# Patient Record
Sex: MALE | Race: WHITE | URBAN - NONMETROPOLITAN AREA
[De-identification: names, ages, dates, MRNs, and addresses within clinical notes are randomized per-mention and may not be internally consistent; named-entity substitution may affect disease eponyms.]

---

## 2017-02-16 ENCOUNTER — OFFICE VISIT - GICH (OUTPATIENT)
Dept: FAMILY MEDICINE | Facility: OTHER | Age: 47
End: 2017-02-16

## 2017-02-16 ENCOUNTER — HISTORY (OUTPATIENT)
Dept: FAMILY MEDICINE | Facility: OTHER | Age: 47
End: 2017-02-16

## 2017-02-16 DIAGNOSIS — Z72.0 TOBACCO USE: ICD-10-CM
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
## 2017-02-16 DIAGNOSIS — J18.9 PNEUMONIA: ICD-10-CM

## 2017-02-16 DIAGNOSIS — I10 ESSENTIAL (PRIMARY) HYPERTENSION: ICD-10-CM

## 2017-02-16 DIAGNOSIS — S06.9X6D: ICD-10-CM

## 2017-02-16 DIAGNOSIS — F32.1 MAJOR DEPRESSIVE DISORDER, SINGLE EPISODE, MODERATE (H): ICD-10-CM

## 2017-02-16 ASSESSMENT — PATIENT HEALTH QUESTIONNAIRE - PHQ9: SUM OF ALL RESPONSES TO PHQ QUESTIONS 1-9: 14

## 2017-05-03 ENCOUNTER — OFFICE VISIT - GICH (OUTPATIENT)
Dept: FAMILY MEDICINE | Facility: OTHER | Age: 47
End: 2017-05-03

## 2017-05-03 ENCOUNTER — HISTORY (OUTPATIENT)
Dept: FAMILY MEDICINE | Facility: OTHER | Age: 47
End: 2017-05-03

## 2017-05-03 DIAGNOSIS — J40 BRONCHITIS: ICD-10-CM

## 2017-05-03 DIAGNOSIS — H61.22 IMPACTED CERUMEN OF LEFT EAR: ICD-10-CM

## 2017-05-03 DIAGNOSIS — Z72.0 TOBACCO USE: ICD-10-CM

## 2017-05-03 ASSESSMENT — PATIENT HEALTH QUESTIONNAIRE - PHQ9: SUM OF ALL RESPONSES TO PHQ QUESTIONS 1-9: 0

## 2017-08-08 ENCOUNTER — COMMUNICATION - GICH (OUTPATIENT)
Dept: FAMILY MEDICINE | Facility: OTHER | Age: 47
End: 2017-08-08

## 2017-08-08 DIAGNOSIS — F32.1 MAJOR DEPRESSIVE DISORDER, SINGLE EPISODE, MODERATE (H): ICD-10-CM

## 2017-12-05 ENCOUNTER — COMMUNICATION - GICH (OUTPATIENT)
Dept: FAMILY MEDICINE | Facility: OTHER | Age: 47
End: 2017-12-05

## 2017-12-05 DIAGNOSIS — I10 ESSENTIAL (PRIMARY) HYPERTENSION: ICD-10-CM

## 2017-12-28 NOTE — TELEPHONE ENCOUNTER
Patient Information     Patient Name MRN Sex Jax Fofana [id number] Male 1970      Telephone Encounter by Luiz Mirza RN at 8/10/2017  7:57 AM     Author:  Luiz Mirza RN Service:  (none) Author Type:  NURS- Registered Nurse     Filed:  8/10/2017  8:00 AM Encounter Date:  2017 Status:  Signed     :  Luiz Mirza RN (NURS- Registered Nurse)            Medication was restarted 17.  Depression-in adults 18 and over  SSRI    Office visit in the past 12 months or as indicated in chart.  Should have clinic visit 1-2 months after initial prescription.    Last visit with PHIL VALENCIA was on: 2017 in [facility]  Next visit with PHIL VALENCAI is on: No future appointment listed with this provider  Next visit with Family Practice is on: No future appointment listed in this department    Max refills 12 months from last office visit or per providers notes.  Prescription refilled per RN Medication Refill Policy.................... LUIZ MIRZA RN ....................  8/10/2017   7:59 AM

## 2018-01-03 NOTE — NURSING NOTE
Patient Information     Patient Name MRN Sex Jax Fofana [id number] Male 1970      Nursing Note by Niurka Delgadillo at 2017  1:30 PM     Author:  Niruka Delgadillo Service:  (none) Author Type:  (none)     Filed:  2017  1:51 PM Encounter Date:  2017 Status:  Signed     :  Niurka Delgadillo            Patient here to establish care. Patient was in [place] with  and with  for left knee scope. Niurka Delgadillo LPN .......................2017  1:29 PM
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
## 2018-01-04 NOTE — PROGRESS NOTES
Patient Information     Patient Name MRN Sex     Jax Villanueva [id number] Male 1970      Progress Notes by Mansoor Hair MD at 5/3/2017  1:00 PM     Author:  Mansoor Hair MD Service:  (none) Author Type:  Physician     Filed:  2017  9:41 AM Encounter Date:  5/3/2017 Status:  Signed     :  Mansoor Hair MD (Physician)            SUBJECTIVE:    Jax Villanueva is a 47 y.o. male who presents for cough left ear plug    HPI Comments: Patient also reports cough and congestion plugged left ear. This been going on for 2-3 weeks. Seems to get better with walking less. No fevers or chills.    Nicotine Dependence   Presents for initial visit. Symptoms include cravings. Preferred tobacco types include cigarettes. Preferred cigarette types include filtered. Preferred strength is regular. Preferred cigarettes are non-menthol. Preferred brands include Benicia. His urge triggers include company of smokers, drinking coffee, driving, meal time and stress. Risk factors do not include drinking alcohol.His first smoke is from 8 to 10 AM. He started smoking when he was 15-17 years old. Past treatments include nothing. Compliance with prior treatments has been variable. Jax is ready to quit. Jax has tried to quit 1 time. There is no history of alcohol abuse and drug use.       No Known Allergies,   Family History       Problem   Relation Age of Onset     Cancer-colon  Father      Diabetes  No Family History      Other  Mother      pulmonary embolism     ,   Current Outpatient Prescriptions on File Prior to Visit       Medication  Sig Dispense Refill     lisinopril-hydrochlorothiazide (10-12.5 mg) tablet (PRINZIDE; ZESTORETIC) Take 1 tablet by mouth once daily. 90 tablet 2     sertraline (ZOLOFT) 100 mg tablet Take 1 tablet by mouth once daily. 90 tablet 1     No current facility-administered medications on file prior to visit.    ,   Past Surgical History:      Procedure  Laterality Date      KNEE SURGERY Left     sees Dr Braswell      ,   Social History       Substance Use Topics         Smoking status:   Current Every Day Smoker     Packs/day:  1.50     Years:  20.00     Types:  Cigarettes     Smokeless tobacco:   Never Used     Alcohol use   No      Comment: rarely     and   Social History       Substance Use Topics         Smoking status:   Current Every Day Smoker     Packs/day:  1.50     Years:  20.00     Types:  Cigarettes     Smokeless tobacco:   Never Used     Alcohol use   No      Comment: rarely        REVIEW OF SYSTEMS:  ROS    OBJECTIVE:  /78  Pulse (!) 102  Temp 97.9  F (36.6  C) (Temporal)  Wt (!) 145.9 kg (321 lb 9.6 oz)  SpO2 92%  BMI 43.62 kg/m2    EXAM:   Physical Exam   Constitutional: He is well-developed, well-nourished, and in no distress.   HENT:   Right Ear: External ear normal.   Left Ear: External ear normal.   Mouth/Throat: No oropharyngeal exudate.   Left TM is occluded by wax and debris   Eyes: Pupils are equal, round, and reactive to light.   Cardiovascular: Normal rate, regular rhythm and normal heart sounds.    Pulmonary/Chest: Effort normal. No respiratory distress. He has wheezes.   Abdominal: Soft.   Neurological: He is alert.   Psychiatric: Affect normal.       ASSESSMENT/PLAN:    ICD-10-CM    1. Tobacco abuse Z72.0 azithromycin (ZITHROMAX) 250 mg tablet      predniSONE (DELTASONE) 20 mg tablet      varenicline (CHANTIX STARTING MONTH BOX) 0.5 mg (11)- 1 mg (42) tablet      varenicline (CHANTIX CONTINUING MONTH BOX) 1 mg tablet   2. Impacted cerumen of left ear H61.22 EAR WAX REMOVAL   3. Bronchitis J40 azithromycin (ZITHROMAX) 250 mg tablet      predniSONE (DELTASONE) 20 mg tablet        Plan:  12 minutes spent with patient counseling tobacco abuse. Encouraged him to quit.  Ear was lavaged with good results.  We'll start the patient on prednisone and Zithromax for his chronic cough.

## 2018-01-04 NOTE — NURSING NOTE
Patient Information     Patient Name MRN Sex Jax Fofana [id number] Male 1970      Nursing Note by Niurka Delgadillo at 5/3/2017  1:00 PM     Author:  Niurka Delgadillo Service:  (none) Author Type:  (none)     Filed:  5/3/2017  1:13 PM Encounter Date:  5/3/2017 Status:  Signed     :  Niurka Delgadillo            Patient here for cough starting 3 weeks ago and his left ear feels plugged. Niurka Delgadillo LPN .......................5/3/2017  1:10 PM

## 2018-01-27 VITALS
SYSTOLIC BLOOD PRESSURE: 124 MMHG | BODY MASS INDEX: 43.62 KG/M2 | TEMPERATURE: 97.9 F | OXYGEN SATURATION: 92 % | DIASTOLIC BLOOD PRESSURE: 78 MMHG | WEIGHT: 315 LBS | HEART RATE: 102 BPM

## 2018-01-27 VITALS
HEART RATE: 100 BPM | HEIGHT: 72 IN | DIASTOLIC BLOOD PRESSURE: 100 MMHG | BODY MASS INDEX: 42.56 KG/M2 | WEIGHT: 314.2 LBS | SYSTOLIC BLOOD PRESSURE: 148 MMHG

## 2018-01-29 ENCOUNTER — DOCUMENTATION ONLY (OUTPATIENT)
Dept: FAMILY MEDICINE | Facility: OTHER | Age: 48
End: 2018-01-29

## 2018-01-29 PROBLEM — H61.22 IMPACTED CERUMEN OF LEFT EAR: Status: ACTIVE | Noted: 2017-05-03

## 2018-01-29 PROBLEM — S06.9X9A TRAUMATIC BRAIN INJURY WITH LOSS OF CONSCIOUSNESS (H): Status: ACTIVE | Noted: 2017-02-16

## 2018-01-29 PROBLEM — J40 BRONCHITIS: Status: ACTIVE | Noted: 2017-05-03
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
## 2018-01-29 PROBLEM — Z72.0 TOBACCO ABUSE: Status: ACTIVE | Noted: 2017-02-16

## 2018-01-29 PROBLEM — F32.1 MODERATE SINGLE CURRENT EPISODE OF MAJOR DEPRESSIVE DISORDER (H): Status: ACTIVE | Noted: 2017-02-16

## 2018-01-29 RX ORDER — SERTRALINE HYDROCHLORIDE 100 MG/1
100 TABLET, FILM COATED ORAL DAILY
COMMUNITY
Start: 2017-08-10

## 2018-01-29 RX ORDER — LISINOPRIL/HYDROCHLOROTHIAZIDE 10-12.5 MG
1 TABLET ORAL DAILY
COMMUNITY
Start: 2017-12-08

## 2018-01-31 ASSESSMENT — PATIENT HEALTH QUESTIONNAIRE - PHQ9
SUM OF ALL RESPONSES TO PHQ QUESTIONS 1-9: 14
SUM OF ALL RESPONSES TO PHQ QUESTIONS 1-9: 0

## 2018-02-12 NOTE — TELEPHONE ENCOUNTER
Patient Information     Patient Name MRN Sex Jax Fofana [id number] Male 1970      Telephone Encounter by Nataly Haque RN at 2017  9:54 AM     Author:  Nataly Haque RN Service:  (none) Author Type:  NURS- Registered Nurse     Filed:  2017  9:54 AM Encounter Date:  2017 Status:  Signed     :  Nataly Haque RN (NURS- Registered Nurse)            Left message to call back  ....................Nataly Haque RN  2017   9:54 AM

## 2018-02-12 NOTE — TELEPHONE ENCOUNTER
Patient Information     Patient Name MRN Sex Jax Fofana [id number] Male 1970      Telephone Encounter by Paulette Huang RN at 2017  9:53 AM     Author:  Paulette Huang RN Service:  (none) Author Type:  NURS- Registered Nurse     Filed:  2017  9:56 AM Encounter Date:  2017 Status:  Signed     :  Paulette Huang RN (NURS- Registered Nurse)            Attempted to reach patient by phone. His EC, Jazzmine answered and reported that he was not currently available. She then actually requested an appointment for this patient. She states that the patient is concerned about COPD related to heavy smoking. She was transferred to scheduling.    Diuretic Combinations    Office visit in the past 12 months or per provider note.    Last visit with PHIL VALENCIA was on: 2017 in [facility]  Next visit with PHIL VALENCIA is on: 2017 in [facility]  Next visit with Family Practice is on: 2017 in [facility]    Lab test requirements:  Creatinine and Potassium annually, if ordering lab, order BMP.  CREATININE (mg/dL)    Date Value   07/15/2017 0.90     POTASSIUM (mmol/L)    Date Value   07/15/2017 3.6       Max refill for 12 months from last office visit or per provider note.    Patient is due for medication management appointment. Limited refill provided at this time. Noted upcoming appointment. Prescription refilled per RN Medication Refill Policy.................... Paulette Huang RN ....................  2017   9:55 AM

## 2018-02-12 NOTE — TELEPHONE ENCOUNTER
"Patient Information     Patient Name MRN Sex Jax Fofana [id number] Male 1970      Telephone Encounter by Nataly Haque RN at 2017  8:43 AM     Author:  Nataly Haque RN Service:  (none) Author Type:  NURS- Registered Nurse     Filed:  2017  8:53 AM Encounter Date:  2017 Status:  Signed     :  aNtaly Haque RN (NURS- Registered Nurse)            Patient established care with Mansoor Hair MD  on 17 and has been in only once since for cough and ear problem.     Per note on 17, \"blood pressure is not under good control. Recommend following up in 2-3 weeks for blood testing. In between that time blood pressure checks here at [facility] outpatient.\" Patient has not been to have any outpatient blood pressure checks.     Attempted reaching Patient to ask about blood pressure and assist him in scheduling a medication management or blood pressure check appointment. Left message to call back  ....................Nataly Haque RN  2017   8:52 AM                  "

## 2025-07-23 ENCOUNTER — HOSPITAL ENCOUNTER (EMERGENCY)
Facility: OTHER | Age: 55
Discharge: HOME | End: 2025-07-23
Attending: FAMILY MEDICINE

## 2025-07-23 ENCOUNTER — APPOINTMENT (OUTPATIENT)
Dept: CT IMAGING | Facility: OTHER | Age: 55
End: 2025-07-23
Attending: STUDENT IN AN ORGANIZED HEALTH CARE EDUCATION/TRAINING PROGRAM

## 2025-07-23 VITALS
BODY MASS INDEX: 42.66 KG/M2 | HEART RATE: 118 BPM | OXYGEN SATURATION: 92 % | WEIGHT: 315 LBS | RESPIRATION RATE: 28 BRPM | HEIGHT: 72 IN | DIASTOLIC BLOOD PRESSURE: 63 MMHG | TEMPERATURE: 97.6 F | SYSTOLIC BLOOD PRESSURE: 124 MMHG

## 2025-07-23 DIAGNOSIS — N20.0 KIDNEY STONE ON LEFT SIDE: Primary | ICD-10-CM

## 2025-07-23 DIAGNOSIS — N39.0 ACUTE LOWER UTI: ICD-10-CM

## 2025-07-23 LAB
ALBUMIN SERPL BCG-MCNC: 4.1 G/DL (ref 3.5–5.2)
ALBUMIN UR-MCNC: 100 MG/DL
ALP SERPL-CCNC: 90 U/L (ref 40–150)
ALT SERPL W P-5'-P-CCNC: 46 U/L (ref 0–70)
ANION GAP SERPL CALCULATED.3IONS-SCNC: 15 MMOL/L (ref 7–15)
APPEARANCE UR: ABNORMAL
AST SERPL W P-5'-P-CCNC: 40 U/L (ref 0–45)
BACTERIA #/AREA URNS HPF: ABNORMAL /HPF
BASOPHILS # BLD AUTO: 0.1 10E3/UL (ref 0–0.2)
BASOPHILS NFR BLD AUTO: 0 %
BILIRUB SERPL-MCNC: 0.5 MG/DL
BILIRUB UR QL STRIP: NEGATIVE
BUN SERPL-MCNC: 17.5 MG/DL (ref 6–20)
CALCIUM SERPL-MCNC: 9.1 MG/DL (ref 8.8–10.4)
CHLORIDE SERPL-SCNC: 102 MMOL/L (ref 98–107)
COLOR UR AUTO: YELLOW
CREAT SERPL-MCNC: 1.06 MG/DL (ref 0.67–1.17)
CRP SERPL-MCNC: 19.66 MG/L
D DIMER PPP FEU-MCNC: 0.8 UG/ML FEU (ref 0–0.5)
EGFRCR SERPLBLD CKD-EPI 2021: 82 ML/MIN/1.73M2
EOSINOPHIL # BLD AUTO: 0.3 10E3/UL (ref 0–0.7)
EOSINOPHIL NFR BLD AUTO: 2 %
ERYTHROCYTE [DISTWIDTH] IN BLOOD BY AUTOMATED COUNT: 18.7 % (ref 10–15)
GLUCOSE SERPL-MCNC: 143 MG/DL (ref 70–99)
GLUCOSE UR STRIP-MCNC: NEGATIVE MG/DL
HCO3 SERPL-SCNC: 24 MMOL/L (ref 22–29)
HCT VFR BLD AUTO: 50.7 % (ref 40–53)
HGB BLD-MCNC: 15.8 G/DL (ref 13.3–17.7)
HGB UR QL STRIP: ABNORMAL
HOLD SPECIMEN: NORMAL
IMM GRANULOCYTES # BLD: 0.1 10E3/UL
IMM GRANULOCYTES NFR BLD: 1 %
INR PPP: 1.01 (ref 0.85–1.15)
KETONES UR STRIP-MCNC: NEGATIVE MG/DL
LACTATE SERPL-SCNC: 1.9 MMOL/L (ref 0.7–2)
LACTATE SERPL-SCNC: 2.1 MMOL/L (ref 0.7–2)
LEUKOCYTE ESTERASE UR QL STRIP: ABNORMAL
LYMPHOCYTES # BLD AUTO: 2.4 10E3/UL (ref 0.8–5.3)
LYMPHOCYTES NFR BLD AUTO: 15 %
MCH RBC QN AUTO: 26.8 PG (ref 26.5–33)
MCHC RBC AUTO-ENTMCNC: 31.2 G/DL (ref 31.5–36.5)
MCV RBC AUTO: 86 FL (ref 78–100)
MONOCYTES # BLD AUTO: 1.2 10E3/UL (ref 0–1.3)
MONOCYTES NFR BLD AUTO: 7 %
MUCOUS THREADS #/AREA URNS LPF: PRESENT /LPF
NEUTROPHILS # BLD AUTO: 12.4 10E3/UL (ref 1.6–8.3)
NEUTROPHILS NFR BLD AUTO: 76 %
NITRATE UR QL: NEGATIVE
NRBC # BLD AUTO: 0 10E3/UL
NRBC BLD AUTO-RTO: 0 /100
NT-PROBNP SERPL-MCNC: 41 PG/ML (ref 0–177)
PH UR STRIP: 7 [PH] (ref 5–9)
PLATELET # BLD AUTO: 291 10E3/UL (ref 150–450)
POTASSIUM SERPL-SCNC: 5 MMOL/L (ref 3.4–5.3)
PROCALCITONIN SERPL IA-MCNC: 0.09 NG/ML
PROT SERPL-MCNC: 7.8 G/DL (ref 6.4–8.3)
PROTHROMBIN TIME: 13.4 SECONDS (ref 11.8–14.8)
RADIOLOGIST FLAGS: NORMAL
RBC # BLD AUTO: 5.9 10E6/UL (ref 4.4–5.9)
RBC URINE: 108 /HPF
SODIUM SERPL-SCNC: 141 MMOL/L (ref 135–145)
SP GR UR STRIP: 1.02 (ref 1–1.03)
SQUAMOUS EPITHELIAL: 3 /HPF
TROPONIN T SERPL HS-MCNC: 11 NG/L
TROPONIN T SERPL HS-MCNC: 11 NG/L
UROBILINOGEN UR STRIP-MCNC: 2 MG/DL
WBC # BLD AUTO: 16.4 10E3/UL (ref 4–11)
WBC CLUMPS #/AREA URNS HPF: PRESENT /HPF
WBC URINE: >182 /HPF

## 2025-07-23 PROCEDURE — 74176 CT ABD & PELVIS W/O CONTRAST: CPT

## 2025-07-23 PROCEDURE — 250N000011 HC RX IP 250 OP 636: Performed by: FAMILY MEDICINE

## 2025-07-23 PROCEDURE — 96365 THER/PROPH/DIAG IV INF INIT: CPT | Mod: XU | Performed by: FAMILY MEDICINE

## 2025-07-23 PROCEDURE — 36415 COLL VENOUS BLD VENIPUNCTURE: CPT | Performed by: FAMILY MEDICINE

## 2025-07-23 PROCEDURE — 87040 BLOOD CULTURE FOR BACTERIA: CPT | Performed by: FAMILY MEDICINE

## 2025-07-23 PROCEDURE — 87086 URINE CULTURE/COLONY COUNT: CPT | Performed by: FAMILY MEDICINE

## 2025-07-23 PROCEDURE — 83880 ASSAY OF NATRIURETIC PEPTIDE: CPT | Performed by: FAMILY MEDICINE

## 2025-07-23 PROCEDURE — 999N000157 HC STATISTIC RCP TIME EA 10 MIN

## 2025-07-23 PROCEDURE — 83605 ASSAY OF LACTIC ACID: CPT | Performed by: FAMILY MEDICINE

## 2025-07-23 PROCEDURE — 93005 ELECTROCARDIOGRAM TRACING: CPT | Performed by: FAMILY MEDICINE

## 2025-07-23 PROCEDURE — 96360 HYDRATION IV INFUSION INIT: CPT | Mod: XS | Performed by: FAMILY MEDICINE

## 2025-07-23 PROCEDURE — 258N000003 HC RX IP 258 OP 636: Performed by: FAMILY MEDICINE

## 2025-07-23 PROCEDURE — 99284 EMERGENCY DEPT VISIT MOD MDM: CPT | Performed by: FAMILY MEDICINE

## 2025-07-23 PROCEDURE — 74176 CT ABD & PELVIS W/O CONTRAST: CPT | Mod: 26 | Performed by: RADIOLOGY

## 2025-07-23 PROCEDURE — 96375 TX/PRO/DX INJ NEW DRUG ADDON: CPT | Mod: XU | Performed by: FAMILY MEDICINE

## 2025-07-23 PROCEDURE — 250N000009 HC RX 250: Performed by: FAMILY MEDICINE

## 2025-07-23 PROCEDURE — 99285 EMERGENCY DEPT VISIT HI MDM: CPT | Mod: 25 | Performed by: FAMILY MEDICINE

## 2025-07-23 PROCEDURE — 85610 PROTHROMBIN TIME: CPT | Performed by: FAMILY MEDICINE

## 2025-07-23 PROCEDURE — 94640 AIRWAY INHALATION TREATMENT: CPT

## 2025-07-23 PROCEDURE — 86140 C-REACTIVE PROTEIN: CPT | Performed by: STUDENT IN AN ORGANIZED HEALTH CARE EDUCATION/TRAINING PROGRAM

## 2025-07-23 PROCEDURE — 85004 AUTOMATED DIFF WBC COUNT: CPT | Performed by: FAMILY MEDICINE

## 2025-07-23 PROCEDURE — 81001 URINALYSIS AUTO W/SCOPE: CPT | Performed by: FAMILY MEDICINE

## 2025-07-23 PROCEDURE — 84145 PROCALCITONIN (PCT): CPT | Performed by: STUDENT IN AN ORGANIZED HEALTH CARE EDUCATION/TRAINING PROGRAM

## 2025-07-23 PROCEDURE — 84484 ASSAY OF TROPONIN QUANT: CPT | Performed by: FAMILY MEDICINE

## 2025-07-23 PROCEDURE — 93010 ELECTROCARDIOGRAM REPORT: CPT | Performed by: INTERNAL MEDICINE

## 2025-07-23 PROCEDURE — 85379 FIBRIN DEGRADATION QUANT: CPT | Performed by: FAMILY MEDICINE

## 2025-07-23 PROCEDURE — 80053 COMPREHEN METABOLIC PANEL: CPT | Performed by: FAMILY MEDICINE

## 2025-07-23 PROCEDURE — 96361 HYDRATE IV INFUSION ADD-ON: CPT | Mod: XS | Performed by: FAMILY MEDICINE

## 2025-07-23 RX ORDER — CIPROFLOXACIN 500 MG/1
500 TABLET, FILM COATED ORAL 2 TIMES DAILY
Qty: 10 TABLET | Refills: 0 | Status: SHIPPED | OUTPATIENT
Start: 2025-07-23 | End: 2025-07-28

## 2025-07-23 RX ORDER — CEFTRIAXONE 2 G/1
2 INJECTION, POWDER, FOR SOLUTION INTRAMUSCULAR; INTRAVENOUS ONCE
Status: COMPLETED | OUTPATIENT
Start: 2025-07-23 | End: 2025-07-23

## 2025-07-23 RX ORDER — IOPAMIDOL 755 MG/ML
110 INJECTION, SOLUTION INTRAVASCULAR ONCE
Status: COMPLETED | OUTPATIENT
Start: 2025-07-23 | End: 2025-07-23

## 2025-07-23 RX ORDER — METHYLPREDNISOLONE SODIUM SUCCINATE 125 MG/2ML
60 INJECTION INTRAMUSCULAR; INTRAVENOUS ONCE
Status: COMPLETED | OUTPATIENT
Start: 2025-07-23 | End: 2025-07-23

## 2025-07-23 RX ORDER — KETOROLAC TROMETHAMINE 30 MG/ML
30 INJECTION, SOLUTION INTRAMUSCULAR; INTRAVENOUS ONCE
Status: COMPLETED | OUTPATIENT
Start: 2025-07-23 | End: 2025-07-23

## 2025-07-23 RX ORDER — IPRATROPIUM BROMIDE AND ALBUTEROL SULFATE 2.5; .5 MG/3ML; MG/3ML
3 SOLUTION RESPIRATORY (INHALATION) ONCE
Status: COMPLETED | OUTPATIENT
Start: 2025-07-23 | End: 2025-07-23

## 2025-07-23 RX ADMIN — IOPAMIDOL 110 ML: 755 INJECTION, SOLUTION INTRAVENOUS at 18:09

## 2025-07-23 RX ADMIN — CEFTRIAXONE 2 G: 2 INJECTION, POWDER, FOR SOLUTION INTRAMUSCULAR; INTRAVENOUS at 17:50

## 2025-07-23 RX ADMIN — KETOROLAC TROMETHAMINE 30 MG: 30 INJECTION, SOLUTION INTRAMUSCULAR at 16:47

## 2025-07-23 RX ADMIN — SODIUM CHLORIDE 80 ML: 9 INJECTION, SOLUTION INTRAVENOUS at 18:18

## 2025-07-23 RX ADMIN — METHYLPREDNISOLONE SODIUM SUCCINATE 62.5 MG: 125 INJECTION, POWDER, FOR SOLUTION INTRAMUSCULAR; INTRAVENOUS at 16:47

## 2025-07-23 RX ADMIN — SODIUM CHLORIDE 1000 ML: 0.9 INJECTION, SOLUTION INTRAVENOUS at 19:21

## 2025-07-23 RX ADMIN — IPRATROPIUM BROMIDE AND ALBUTEROL SULFATE 3 ML: .5; 3 SOLUTION RESPIRATORY (INHALATION) at 17:05

## 2025-07-23 ASSESSMENT — ACTIVITIES OF DAILY LIVING (ADL)
ADLS_ACUITY_SCORE: 41

## 2025-07-23 ASSESSMENT — ENCOUNTER SYMPTOMS
VOMITING: 0
FEVER: 0
SHORTNESS OF BREATH: 0
FATIGUE: 1
FLANK PAIN: 0
DIARRHEA: 0
ABDOMINAL PAIN: 0
NAUSEA: 0
COUGH: 0

## 2025-07-23 ASSESSMENT — COLUMBIA-SUICIDE SEVERITY RATING SCALE - C-SSRS
2. HAVE YOU ACTUALLY HAD ANY THOUGHTS OF KILLING YOURSELF IN THE PAST MONTH?: NO
1. IN THE PAST MONTH, HAVE YOU WISHED YOU WERE DEAD OR WISHED YOU COULD GO TO SLEEP AND NOT WAKE UP?: NO
6. HAVE YOU EVER DONE ANYTHING, STARTED TO DO ANYTHING, OR PREPARED TO DO ANYTHING TO END YOUR LIFE?: NO

## 2025-07-23 NOTE — ED TRIAGE NOTES
ED Nursing Triage Note (General)   ________________________________    Jax Villanueva is a 55 year old Male that presents to triage via private vehicle with complaints of R) foot pain/numbess x1 week.  Patient states he has sustained 2 falls within the last week and since then now also has R) sided hip pain.  Pain is rated 9/10, however, no OTC medication taken prior to arrival. Patient also reports increased SOB.  Patient has a hx of COPD and states was previously on 02 after being dx with pneumonia, however, does not wear home 02.  Patient states 02 is slowly getting worse over the past year, however, states he has not been seen during the past year due to lack of insurance.   Significant symptoms had onset 1 week(s) ago.  Vital signs:  Temp: 97.6  F (36.4  C) Temp src: Tympanic BP: 126/70 Pulse: 116   Resp: 28 SpO2: 92 %     Height: 182.9 cm (6') Weight: (!) 145.6 kg (321 lb)  Estimated body mass index is 43.54 kg/m  as calculated from the following:    Height as of this encounter: 1.829 m (6').    Weight as of this encounter: 145.6 kg (321 lb).  PRE HOSPITAL PRIOR LIVING SITUATION Alone      Triage Assessment (Adult)       Row Name 07/23/25 1559          Triage Assessment    Airway WDL WDL        Respiratory WDL    Respiratory WDL X        Skin Circulation/Temperature WDL    Skin Circulation/Temperature WDL WDL        Cardiac WDL    Cardiac WDL WDL     Cardiac Rhythm ST        Peripheral/Neurovascular WDL    Peripheral Neurovascular WDL WDL        Cognitive/Neuro/Behavioral WDL    Cognitive/Neuro/Behavioral WDL WDL
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
## 2025-07-24 LAB
BACTERIA SPEC CULT: NORMAL
BACTERIA SPEC CULT: NORMAL
BACTERIA UR CULT: NORMAL

## 2025-07-24 NOTE — ED PROVIDER NOTES
[facility]    eMERGENCY dEPARTMENT eNCOUnter    Name:  Jax Villanueva  MRN:  [id number]  :  1970    Date of Service:  2025     Chief Complaint   Patient presents with    Fall    Shortness of Breath    Foot Pain       After sign-out, I personally reviewed pertinent labs, history and imaging and examined the patient myself.    Jax Villanueva is a 55 year old male who presented to the Emergency Department for evaluation of increasing dyspnea as well as worsening falls and who was signed out to me by the previous ED provider. Please refer to previous documentation for complete details of HPI and complete exam.     At the time of sign out pertinent findings include possible evidence of obstructing stone noted on CTA pulm as well as elevated lactate to 2.1 and leukocytosis to 16K.     The patient is currently pending CT Noncon abdomen pelvis.     Working impression / differential diagnosis is pyelonephritis, acute cystitis, obstructing stone.     RADIOLOGY  CT Abdomen Pelvis w/o Contrast   Final Result   IMPRESSION:    1.  The excreted iodinated contrast material from today's earlier CTA chest completely opacifies the intrarenal urinary collecting systems, ureters and bladder and obscures the stone in the left renal pelvis. However, asymmetric mural thickening of the    left renal pelvis and slight stranding of the peripelvic fat indicate the stone is causing irritation/inflammation and most likely causing some degree of intermittent obstruction. Recommend nonurgent Urology consultation.   2.  Mild muscular hypertrophy of the bladder.   3.  Severe diffuse hepatic steatosis and moderate hepatomegaly.   4.  Mild splenomegaly.   5.  Right adrenal gland nodule measuring 2.7 x 2.0 cm, previously 1.5 x 1.2 cm in 2016. Consider adrenal protocol CT or MRI         CT Chest Pulmonary Embolism w Contrast   Final Result   IMPRESSION:   1.  No acute findings in the chest. No acute pulmonary  embolism.      2.  Ascending thoracic aortic aneurysm measuring 4.3 cm. No aortic dissection.      3.  Left renal pelvis 0.8 cm calculus, which appears loose and could be intermittently obstructing.      4.  Incidental right adrenal gland 2.7 cm nodule, previously 1.5 cm in 2016. Consider adrenal protocol CT (see reference below).         REFERENCE:   Management of Incidental Adrenal Masses: A White Paper of the ACR Incidental Findings Committee. J Am Philip Radiol 2017;14:6917-5256.      ? 1 cm and < 4 cm:       No prior imaging and no history of cancer:   1-2 cm: probably benign. Consider 12-month CT adrenal follow-up.   >2cm, <4 cm: Adrenal CT.      No prior imaging and history of cancer: Adrenal CT      Prior Imaging:   Stable for 1 year: Benign. No follow-up.   New or enlarging:   --Adrenal CT or resection if no history of cancer.   --PET/CT or biopsy if positive history of cancer.         [Consider Follow Up: Right adrenal gland 2.7 cm nodule]      This report will be copied to the [facility] to ensure a provider acknowledges the finding.          XR Chest 2 Views   Final Result   IMPRESSION: Heart size and pulmonary vessels are normal. Bilateral pleural or subpleural thickening unchanged. New opacity within lingula obscures left cardiac margin may relate to atelectasis or consolidation.           LABS  Results for orders placed or performed during the hospital encounter of 07/23/25   XR Chest 2 Views     Status: None    Narrative    EXAM: XR CHEST 2 VIEWS  LOCATION: [facility]  DATE: 7/23/2025    INDICATION: dyspnea  COMPARISON: 7/15/2017      Impression    IMPRESSION: Heart size and pulmonary vessels are normal. Bilateral pleural or subpleural thickening unchanged. New opacity within lingula obscures left cardiac margin may relate to atelectasis or consolidation.   CT Chest Pulmonary Embolism w Contrast     Status: None   Result Value Ref Range    Radiologist flags Right  adrenal gland 2.7 cm nodule     Narrative    EXAM: CT CHEST PULMONARY EMBOLISM W CONTRAST  LOCATION: [facility]  DATE: 7/23/2025    INDICATION: Hypoxia and tachycardia.  COMPARISON: Chest radiograph 7/23/2025  TECHNIQUE: CT chest pulmonary angiogram during arterial phase injection of IV contrast. Multiplanar reformats and MIP reconstructions were performed. Dose reduction techniques were used.   CONTRAST: ISOVUE 370, 110 mL    FINDINGS:  ANGIOGRAM CHEST: Pulmonary arteries are normal caliber and negative for pulmonary emboli. Mild aortic calcifications. Ascending thoracic aortic aneurysm measuring 4.3 cm (series 7, image 145). Thoracic aorta is negative for dissection. No CT evidence of   right heart strain.    LUNGS AND PLEURA: Linear atelectasis or scarring in the left upper lobe. No focal airspace disease. No pleural effusion or pneumothorax. Left lower lobe 0.6 cm subpleural nodule, unchanged since 2016, benign; no follow-up indicated. Few subpleural   nodules in the right lung, compatible with benign intrapulmonary lymph nodes. No suspicious pulmonary nodules identified.    MEDIASTINUM/AXILLAE: No lymphadenopathy. No pericardial effusion.    CORONARY ARTERY CALCIFICATION: None.    UPPER ABDOMEN: Right adrenal gland nodule measuring 2.7 x 2.0 cm, previously 1.5 x 1.2 cm in 2016. Diffuse hepatic steatosis. 0.8 cm calculus in the left renal pelvis, which appears loose and could be intermittently obstructing.    MUSCULOSKELETAL: Multilevel degenerative changes of the spine.      Impression    IMPRESSION:  1.  No acute findings in the chest. No acute pulmonary embolism.    2.  Ascending thoracic aortic aneurysm measuring 4.3 cm. No aortic dissection.    3.  Left renal pelvis 0.8 cm calculus, which appears loose and could be intermittently obstructing.    4.  Incidental right adrenal gland 2.7 cm nodule, previously 1.5 cm in 2016. Consider adrenal protocol CT (see reference  below).      REFERENCE:  Management of Incidental Adrenal Masses: A White Paper of the ACR Incidental Findings Committee. J Am Philip Radiol 2017;14:5931-3720.    ? 1 cm and < 4 cm:     No prior imaging and no history of cancer:  1-2 cm: probably benign. Consider 12-month CT adrenal follow-up.  >2cm, <4 cm: Adrenal CT.    No prior imaging and history of cancer: Adrenal CT    Prior Imaging:  Stable for 1 year: Benign. No follow-up.  New or enlarging:  --Adrenal CT or resection if no history of cancer.  --PET/CT or biopsy if positive history of cancer.      [Consider Follow Up: Right adrenal gland 2.7 cm nodule]    This report will be copied to the [facility] to ensure a provider acknowledges the finding.      CT Abdomen Pelvis w/o Contrast     Status: None    Narrative    EXAM: CT ABDOMEN PELVIS W/O CONTRAST  LOCATION: [facility]  DATE: 7/23/2025    INDICATION: An 8 mm calculus in the left renal pelvis appears loose and could be intermittently obstructing observed incidentally at today's CTA chest.  COMPARISON: Today's 6:08 PM 7/23/2025 CTA chest and CT abdomen pelvis 1/26/2016  TECHNIQUE: CT scan of the abdomen and pelvis was performed without IV contrast. Multiplanar reformats were obtained. Dose reduction techniques were used.  CONTRAST: None.    FINDINGS:   LOWER CHEST: Benign 6 mm subpleural left lower lobe nodule with long-term stability requires no follow-up. Mild airway thickening and mosaic attenuation pattern compatible with nonspecific bronchial inflammation and multifocal air trapping, respectively.   Visualized lungs are otherwise clear. No pleural effusion. Heart size normal with no pericardial effusion.     HEPATOBILIARY: Severe diffuse hepatic steatosis and moderate hepatomegaly.  Gallbladder unremarkable with no visible stone or sludge material.  No  bile duct dilatation.     PANCREAS: Normal.    SPLEEN: Spleen is enlarged at 14.5 cm.    ADRENAL GLANDS: Right adrenal  gland nodule measuring 2.7 x 2.0 cm, previously 1.5 x 1.2 cm in 2016. Adrenal glands otherwise normal.    KIDNEYS/BLADDER: The excreted iodinated contrast material from today's earlier CTA chest completely opacifies the intrarenal urinary collecting systems, ureters and bladder and obscures the stone in the left renal pelvis. However, asymmetric mural   thickening of the left renal pelvis and slight stranding of the peripelvic fat indicate the stone is causing irritation/inflammation and most likely causing some degree of intermittent obstruction. Recommend nonurgent urology consultation. Mild muscular   hypertrophy of the bladder. Kidneys, ureters and bladder are otherwise normal.    BOWEL: Normal appendix. Bowel is normal with no obstruction or inflammatory change.    LYMPH NODES: No lymphadenopathy.    VASCULATURE: Normal caliber abdominal aorta.      PELVIC ORGANS: No pelvic mass or fluid. Small fat-containing left inguinal hernia.    MUSCULOSKELETAL: Multilevel degenerative disc disease and degenerative facet arthritis lumbar spine.      Impression    IMPRESSION:   1.  The excreted iodinated contrast material from today's earlier CTA chest completely opacifies the intrarenal urinary collecting systems, ureters and bladder and obscures the stone in the left renal pelvis. However, asymmetric mural thickening of the   left renal pelvis and slight stranding of the peripelvic fat indicate the stone is causing irritation/inflammation and most likely causing some degree of intermittent obstruction. Recommend nonurgent Urology consultation.  2.  Mild muscular hypertrophy of the bladder.  3.  Severe diffuse hepatic steatosis and moderate hepatomegaly.  4.  Mild splenomegaly.  5.  Right adrenal gland nodule measuring 2.7 x 2.0 cm, previously 1.5 x 1.2 cm in 2016. Consider adrenal protocol CT or MRI     Comprehensive Metabolic Panel (Limited Occurrences)     Status: Abnormal   Result Value Ref Range    Sodium 141 135 -  145 mmol/L    Potassium 5.0 3.4 - 5.3 mmol/L    Carbon Dioxide (CO2) 24 22 - 29 mmol/L    Anion Gap 15 7 - 15 mmol/L    Urea Nitrogen 17.5 6.0 - 20.0 mg/dL    Creatinine 1.06 0.67 - 1.17 mg/dL    GFR Estimate 82 >60 mL/min/1.73m2    Calcium 9.1 8.8 - 10.4 mg/dL    Chloride 102 98 - 107 mmol/L    Glucose 143 (H) 70 - 99 mg/dL    Alkaline Phosphatase 90 40 - 150 U/L    AST 40 0 - 45 U/L    ALT 46 0 - 70 U/L    Protein Total 7.8 6.4 - 8.3 g/dL    Albumin 4.1 3.5 - 5.2 g/dL    Bilirubin Total 0.5 <=1.2 mg/dL   D dimer quantitative     Status: Abnormal   Result Value Ref Range    D-Dimer Quantitative 0.80 (H) 0.00 - 0.50 ug/mL FEU    Narrative    This D-dimer assay is intended for use in conjunction with a clinical pretest probability assessment model to exclude pulmonary embolism (PE) and deep venous thrombosis (DVT) in outpatients suspected of PE or DVT. The cut-off value is 0.50 ug/mL FEU.    For patients 50 years of age or older, the application of age-adjusted cut-off values for D-Dimer may increase the specificity without significant effect on sensitivity. The literature suggested calculation age adjusted cut-off in ug/L = age in years x 10 ug/L. The results in this laboratory are reported as ug/mL rather than ug/L. The calculation for age adjusted cut off in ug/mL= age in years x 0.01 ug/mL. For example, the cut off for a 76 year old male is 75 x 0.01 ug/mL = 0.76 ug/mL (760 ug/L).    M Teofilo et al. Age adjusted D-dimer cut-off levels to rule out pulmonary embolism: The ADJUST-PE Study. BEATRIZ 2014;311:0336-7866.; ZENA Hudson et al. Diagnostic accuracy of conventional or age adjusted D-dimer cutoff values in older patients with suspected venous thromboembolism. Systemic review and meta-analysis. BMJ 2013:346:f2492.   INR     Status: Normal   Result Value Ref Range    INR 1.01 0.85 - 1.15    PT 13.4 11.8 - 14.8 Seconds   Troponin T, High Sensitivity     Status: Normal   Result Value Ref Range    Troponin T, High  Sensitivity 11 <=22 ng/L   NT-proBNP     Status: Normal   Result Value Ref Range    NT-proBNP 41 0 - 177 pg/mL   [place] Draw     Status: None    Narrative    The following orders were created for panel order [place] Draw.  Procedure                               Abnormality         Status                     ---------                               -----------         ------                     Extra Red Top Tube[[id number]]                              Final result                 Please view results for these tests on the individual orders.   Extra Red Top Tube     Status: None   Result Value Ref Range    Hold Specimen [facility]    CBC with platelets and differential     Status: Abnormal   Result Value Ref Range    WBC Count 16.4 (H) 4.0 - 11.0 10e3/uL    RBC Count 5.90 4.40 - 5.90 10e6/uL    Hemoglobin 15.8 13.3 - 17.7 g/dL    Hematocrit 50.7 40.0 - 53.0 %    MCV 86 78 - 100 fL    MCH 26.8 26.5 - 33.0 pg    MCHC 31.2 (L) 31.5 - 36.5 g/dL    RDW 18.7 (H) 10.0 - 15.0 %    Platelet Count 291 150 - 450 10e3/uL    % Neutrophils 76 %    % Lymphocytes 15 %    % Monocytes 7 %    % Eosinophils 2 %    % Basophils 0 %    % Immature Granulocytes 1 %    NRBCs per 100 WBC 0 <1 /100    Absolute Neutrophils 12.4 (H) 1.6 - 8.3 10e3/uL    Absolute Lymphocytes 2.4 0.8 - 5.3 10e3/uL    Absolute Monocytes 1.2 0.0 - 1.3 10e3/uL    Absolute Eosinophils 0.3 0.0 - 0.7 10e3/uL    Absolute Basophils 0.1 0.0 - 0.2 10e3/uL    Absolute Immature Granulocytes 0.1 <=0.4 10e3/uL    Absolute NRBCs 0.0 10e3/uL   UA with Microscopic reflex to Culture     Status: Abnormal    Specimen: Urine, Midstream   Result Value Ref Range    Color Urine Yellow Colorless, Straw, Light Yellow, Yellow    Appearance Urine Cloudy (A) Clear    Glucose Urine Negative Negative mg/dL    Bilirubin Urine Negative Negative    Ketones Urine Negative Negative mg/dL    Specific Gravity Urine 1.025 1.000 - 1.030    Blood Urine Moderate (A) Negative    pH Urine 7.0 5.0 - 9.0    Protein  Albumin Urine 100 (A) Negative mg/dL    Urobilinogen Urine 2.0 (A) Normal mg/dL    Nitrite Urine Negative Negative    Leukocyte Esterase Urine Large (A) Negative    Bacteria Urine Moderate (A) None Seen /HPF    WBC Clumps Urine Present (A) None Seen /HPF    Mucus Urine Present (A) None Seen /LPF    RBC Urine 108 (H) <=2 /HPF    WBC Urine >182 (H) <=5 /HPF    Squamous Epithelials Urine 3 (H) <=1 /HPF    Narrative    Urine Culture ordered based on laboratory criteria   Lactic acid whole blood with 1x repeat in 2 hr when >2     Status: Abnormal   Result Value Ref Range    Lactic Acid, Initial 2.1 (H) 0.7 - 2.0 mmol/L   Troponin T, High Sensitivity     Status: Normal   Result Value Ref Range    Troponin T, High Sensitivity 11 <=22 ng/L   Lactic acid whole blood     Status: Normal   Result Value Ref Range    Lactic Acid 1.9 0.7 - 2.0 mmol/L   Procalcitonin     Status: Normal   Result Value Ref Range    Procalcitonin 0.09 <0.50 ng/mL   CRP inflammation     Status: Abnormal   Result Value Ref Range    CRP Inflammation 19.66 (H) <5.00 mg/L   CBC with Platelets and Differential (Limited Occurrences)     Status: Abnormal    Narrative    The following orders were created for panel order CBC with Platelets and Differential (Limited Occurrences).  Procedure                               Abnormality         Status                     ---------                               -----------         ------                     CBC with platelets and ...[[id number]]  Abnormal            Final result                 Please view results for these tests on the individual orders.       ED COURSE & MEDICAL DECISION MAKING      ED Course as of 07/23/25 2136 Wed Jul 23, 2025   1619 EKG per my interpretation sinus tachycardia rate 120.  Left axis normal intervals.  No acute ST changes.  Q waves in leads III and aVF unchanged from prior.   1621 Wheezing with decr air movement. Will give nebs/steroids   1735 Discussed results with patient.  Was  given a dose of Rocephin for presumed UTI.  He states he has had 1 of these before.  Additionally elevated D-dimer hypoxia and tachycardia will check CT scan to rule out pulmonary embolism.   1831 CT per my read: no PE seen. Will await final radiology read   2109 CT without overt obstructive stone, evidence of RIGHT adrenal gland nodule     Patient recently seen and evaluated for underlying symptoms of shortness of breath.  Patient did not have any evidence of COPD exacerbation, heart failure exacerbation nor any evidence of underlying pulmonary infection nor PE.  There is no evidence of underlying ACS given his negative troponin and virtually unremarkable EKG.  Overall I suspect the patient has chronic and severe deconditioning in the setting of his tobacco use and underlying COPD which is likely contributing to his chronic dyspnea and dyspnea on exertion.  He was walk tested prior to his discharge and did not desaturate below 92% with walking.  The patient did however have an incidental finding of a possible stone noted on CTA pulm and subsequent further evaluation with CT Noncon of the abdomen pelvis was performed.  There was no evidence of any obstructing stone at this time, however, patient did have evidence of mild pyelonephritis versus acute cystitis.  Patient was given a prescription for ciprofloxacin twice daily x 5 days for treatment of this.  Patient did have an incidentally noted adrenal gland nodule on his CT abdomen and was recommended to follow-up with his primary care physician for further imaging and evaluation.    FINAL IMPRESSION      1. Kidney stone on left side    2. Acute lower UTI    3.      Incidental adrenal nodule        Disposition and Plan:      Medications: Continue your current medications as prescribed.  Medications started at discharge:   New Prescriptions    CIPROFLOXACIN (CIPRO) 500 MG TABLET    Take 1 tablet (500 mg) by mouth 2 times daily for 5 days.        Maikel Prather,  MD  07/23/25 [id number]

## 2025-07-24 NOTE — DISCHARGE INSTRUCTIONS
During this ER visit you are seen and evaluated for shortness of breath.  We were concerned due to a possible kidney stone that we saw on CT imaging of your chest.  I recommend that you start an antibiotic known as ciprofloxacin for the next 5 days. You will need to take this antibiotic twice a day meaning once in the morning and once in the evening. I have sent this medication to [facility] for you to . Should you experience worsening flank or back pain or pain with urination please do not hesitate to return to the ER for further treatment evaluation.  I suspect that the majority of your shortness of breath is due to severe deconditioning and continued smoking.  We were able to perform a chest CT to evaluate for possible pneumonia versus blood clot versus COPD exacerbation and did not identify one at this time.  I also recommend that you follow-up with your primary care physician for your chronic medical conditions.  It was also incidentally noted on your CT scan of your abdomen that you do have an adrenal gland nodule and I would recommend following up with your primary care physician for further imaging and evaluation of this.

## 2025-07-28 LAB
ATRIAL RATE - MUSE: 119 BPM
BACTERIA SPEC CULT: NO GROWTH
BACTERIA SPEC CULT: NO GROWTH
DIASTOLIC BLOOD PRESSURE - MUSE: NORMAL MMHG
INTERPRETATION ECG - MUSE: NORMAL
P AXIS - MUSE: 47 DEGREES
PR INTERVAL - MUSE: 156 MS
QRS DURATION - MUSE: 70 MS
QT - MUSE: 320 MS
QTC - MUSE: 450 MS
R AXIS - MUSE: 9 DEGREES
SYSTOLIC BLOOD PRESSURE - MUSE: NORMAL MMHG
T AXIS - MUSE: 17 DEGREES
VENTRICULAR RATE- MUSE: 119 BPM

## (undated) RX ORDER — METHYLPREDNISOLONE SODIUM SUCCINATE 125 MG/2ML
INJECTION INTRAMUSCULAR; INTRAVENOUS
Status: DISPENSED
Start: 2025-07-23

## (undated) RX ORDER — KETOROLAC TROMETHAMINE 30 MG/ML
INJECTION, SOLUTION INTRAMUSCULAR; INTRAVENOUS
Status: DISPENSED
Start: 2025-07-23

## (undated) RX ORDER — CEFTRIAXONE 2 G/1
INJECTION, POWDER, FOR SOLUTION INTRAMUSCULAR; INTRAVENOUS
Status: DISPENSED
Start: 2025-07-23

## (undated) RX ORDER — IPRATROPIUM BROMIDE AND ALBUTEROL SULFATE 2.5; .5 MG/3ML; MG/3ML
SOLUTION RESPIRATORY (INHALATION)
Status: DISPENSED
Start: 2025-07-23